# Patient Record
Sex: MALE | Race: WHITE | Employment: FULL TIME | URBAN - METROPOLITAN AREA
[De-identification: names, ages, dates, MRNs, and addresses within clinical notes are randomized per-mention and may not be internally consistent; named-entity substitution may affect disease eponyms.]

---

## 2019-06-06 ENCOUNTER — HOSPITAL ENCOUNTER (EMERGENCY)
Facility: HOSPITAL | Age: 59
Discharge: HOME | End: 2019-06-06
Attending: EMERGENCY MEDICINE
Payer: COMMERCIAL

## 2019-06-06 VITALS
OXYGEN SATURATION: 96 % | RESPIRATION RATE: 18 BRPM | WEIGHT: 208 LBS | SYSTOLIC BLOOD PRESSURE: 152 MMHG | TEMPERATURE: 99 F | HEIGHT: 70 IN | HEART RATE: 75 BPM | DIASTOLIC BLOOD PRESSURE: 100 MMHG | BODY MASS INDEX: 29.78 KG/M2

## 2019-06-06 DIAGNOSIS — S40.022A CONTUSION OF LEFT UPPER EXTREMITY, INITIAL ENCOUNTER: ICD-10-CM

## 2019-06-06 DIAGNOSIS — S50.12XA CONTUSION OF LEFT FOREARM, INITIAL ENCOUNTER: Primary | ICD-10-CM

## 2019-06-06 PROCEDURE — 99283 EMERGENCY DEPT VISIT LOW MDM: CPT
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
## (undated) NOTE — ED AVS SNAPSHOT
Teresa Steiner   MRN: [id number]    Department:  [facility] Emergency Department   Date of Visit:  6/6/2019           Disclosure     Insurance plans vary and the physician(s) referred by the ER may not be covered by your plan.  Please contact CARE PHYSICIAN AT ONCE OR RETURN IMMEDIATELY TO THE EMERGENCY DEPARTMENT. If you have been prescribed any medication(s), please fill your prescription right away and begin taking the medication(s) as directed.   If you believe that any of the medications